# Patient Record
Sex: FEMALE | Race: WHITE | NOT HISPANIC OR LATINO | URBAN - METROPOLITAN AREA
[De-identification: names, ages, dates, MRNs, and addresses within clinical notes are randomized per-mention and may not be internally consistent; named-entity substitution may affect disease eponyms.]

---

## 2019-10-17 NOTE — PATIENT DISCUSSION
New Prescription: Besivance (besifloxacin): drops,suspension: 0.6% 1 drop as directed as directed into right eye 10-

## 2019-10-17 NOTE — PATIENT DISCUSSION
CORNEAL ULCER OD, SECONDARY TO CL WEAR: START BESIVANCE Q15MIN X 1 HR, THEN Q1HR WHILE AWAKE, AND Q2HRS DURING SLEEPING HOURS. NO CONTACT LENS WEAR UNTIL APPROVED BY OPTOMETRIST OR OPHTHALMOLOGIST. PRESERVATIVE FREE ARTIFICIAL TEARS Q30 MIN OD. FOLLOW UP TOMORROW.

## 2019-10-18 NOTE — PATIENT DISCUSSION
Continue: Besivance (besifloxacin): drops,suspension: 0.6% 1 drop as directed as directed into right eye 10-

## 2019-10-18 NOTE — PATIENT DISCUSSION
CORNEAL ULCER OD: SLOWLY IMPROVING, SYMPTOMS IMPROVING. CONTINUE BESIVANCE Q2HRS OD UNTIL SEEN BY DR. Darby Hamman.  ATs Q2HRS OD.

## 2019-10-21 NOTE — PATIENT DISCUSSION
Discussed with patient there may some discomfort including pain, redness, light sensitivity, tearing and blurry vision. Gave RX for Ocuflox 1 drop 4 times a day. Artificial tears may be used in between doses of Ocuflox to relieve discomfort.

## 2019-10-21 NOTE — PATIENT DISCUSSION
Patient wears contacts and she has monovision contacts. The left eye is for near vision and the ulcer she has is in the right eye which is distance eye, so she is having a hard time driving.

## 2019-10-21 NOTE — PATIENT DISCUSSION
Patient has been instructed to use Bessivance 1 gtt 4 times daily until follow back on Thursday morning

## 2019-10-24 NOTE — PATIENT DISCUSSION
Discussed with patient there may some discomfort including pain, redness, light sensitivity, tearing and blurry vision. The patient was instructed to stay out of contact lenses until directed by doctor after re-evaluation. Continue Bessivance 1 drop twice daily for 1 week and continue to stay out of contacts for 2 weeks .

## 2020-12-23 ENCOUNTER — IMPORTED ENCOUNTER (OUTPATIENT)
Dept: URBAN - METROPOLITAN AREA CLINIC 50 | Facility: CLINIC | Age: 60
End: 2020-12-23

## 2021-02-02 ENCOUNTER — IMPORTED ENCOUNTER (OUTPATIENT)
Dept: URBAN - METROPOLITAN AREA CLINIC 50 | Facility: CLINIC | Age: 61
End: 2021-02-02

## 2021-04-17 ASSESSMENT — TONOMETRY
OD_IOP_MMHG: 14
OS_IOP_MMHG: 15

## 2021-04-17 ASSESSMENT — VISUAL ACUITY
OD_BAT: 20/30
OD_OTHER: 20/30. 20/60.
OS_BAT: 20/30
OD_CC: J1+
OS_CC: J1+
OD_SC: 20/30-2
OS_SC: 20/30-1
OS_OTHER: 20/30. 20/60.